# Patient Record
Sex: MALE | Race: WHITE | Employment: FULL TIME | URBAN - METROPOLITAN AREA
[De-identification: names, ages, dates, MRNs, and addresses within clinical notes are randomized per-mention and may not be internally consistent; named-entity substitution may affect disease eponyms.]

---

## 2019-09-26 ENCOUNTER — HOSPITAL ENCOUNTER (EMERGENCY)
Facility: CLINIC | Age: 44
Discharge: HOME | End: 2019-09-26
Attending: EMERGENCY MEDICINE | Admitting: EMERGENCY MEDICINE
Payer: COMMERCIAL

## 2019-09-26 ENCOUNTER — APPOINTMENT (OUTPATIENT)
Dept: CT IMAGING | Facility: CLINIC | Age: 44
End: 2019-09-26
Attending: EMERGENCY MEDICINE
Payer: COMMERCIAL

## 2019-09-26 VITALS
HEIGHT: 68 IN | TEMPERATURE: 98.9 F | WEIGHT: 205 LBS | OXYGEN SATURATION: 97 % | SYSTOLIC BLOOD PRESSURE: 125 MMHG | DIASTOLIC BLOOD PRESSURE: 92 MMHG | BODY MASS INDEX: 31.07 KG/M2 | HEART RATE: 72 BPM | RESPIRATION RATE: 18 BRPM

## 2019-09-26 DIAGNOSIS — Z72.0 TOBACCO USE: ICD-10-CM

## 2019-09-26 DIAGNOSIS — R10.9 RIGHT LATERAL ABDOMINAL PAIN: ICD-10-CM

## 2019-09-26 DIAGNOSIS — R91.1 PULMONARY NODULE, RIGHT: ICD-10-CM

## 2019-09-26 LAB
ALBUMIN SERPL-MCNC: 4 G/DL (ref 3.4–5)
ALBUMIN UR-MCNC: NEGATIVE MG/DL
ALP SERPL-CCNC: 133 U/L (ref 40–150)
ALT SERPL W P-5'-P-CCNC: 27 U/L (ref 0–70)
ANION GAP SERPL CALCULATED.3IONS-SCNC: 4 MMOL/L (ref 3–14)
APPEARANCE UR: CLEAR
AST SERPL W P-5'-P-CCNC: 10 U/L (ref 0–45)
BASOPHILS # BLD AUTO: 0.1 10E9/L (ref 0–0.2)
BASOPHILS NFR BLD AUTO: 1.3 %
BILIRUB SERPL-MCNC: 0.4 MG/DL (ref 0.2–1.3)
BILIRUB UR QL STRIP: NEGATIVE
BUN SERPL-MCNC: 7 MG/DL (ref 7–30)
CALCIUM SERPL-MCNC: 9.1 MG/DL (ref 8.5–10.1)
CHLORIDE SERPL-SCNC: 107 MMOL/L (ref 94–109)
CO2 SERPL-SCNC: 28 MMOL/L (ref 20–32)
COLOR UR AUTO: NORMAL
CREAT SERPL-MCNC: 0.81 MG/DL (ref 0.66–1.25)
DIFFERENTIAL METHOD BLD: NORMAL
EOSINOPHIL # BLD AUTO: 0.4 10E9/L (ref 0–0.7)
EOSINOPHIL NFR BLD AUTO: 4.9 %
ERYTHROCYTE [DISTWIDTH] IN BLOOD BY AUTOMATED COUNT: 13.2 % (ref 10–15)
GFR SERPL CREATININE-BSD FRML MDRD: >90 ML/MIN/{1.73_M2}
GLUCOSE SERPL-MCNC: 84 MG/DL (ref 70–99)
GLUCOSE UR STRIP-MCNC: NEGATIVE MG/DL
HCT VFR BLD AUTO: 47.3 % (ref 40–53)
HGB BLD-MCNC: 15.5 G/DL (ref 13.3–17.7)
HGB UR QL STRIP: NEGATIVE
IMM GRANULOCYTES # BLD: 0 10E9/L (ref 0–0.4)
IMM GRANULOCYTES NFR BLD: 0.3 %
KETONES UR STRIP-MCNC: NEGATIVE MG/DL
LEUKOCYTE ESTERASE UR QL STRIP: NEGATIVE
LIPASE SERPL-CCNC: 164 U/L (ref 73–393)
LYMPHOCYTES # BLD AUTO: 1.7 10E9/L (ref 0.8–5.3)
LYMPHOCYTES NFR BLD AUTO: 21.1 %
MCH RBC QN AUTO: 29.8 PG (ref 26.5–33)
MCHC RBC AUTO-ENTMCNC: 32.8 G/DL (ref 31.5–36.5)
MCV RBC AUTO: 91 FL (ref 78–100)
MONOCYTES # BLD AUTO: 0.8 10E9/L (ref 0–1.3)
MONOCYTES NFR BLD AUTO: 10.3 %
NEUTROPHILS # BLD AUTO: 5 10E9/L (ref 1.6–8.3)
NEUTROPHILS NFR BLD AUTO: 62.1 %
NITRATE UR QL: NEGATIVE
NRBC # BLD AUTO: 0 10*3/UL
NRBC BLD AUTO-RTO: 0 /100
PH UR STRIP: 6.5 PH (ref 5–7)
PLATELET # BLD AUTO: 380 10E9/L (ref 150–450)
POTASSIUM SERPL-SCNC: 3.8 MMOL/L (ref 3.4–5.3)
PROT SERPL-MCNC: 7.9 G/DL (ref 6.8–8.8)
RBC # BLD AUTO: 5.2 10E12/L (ref 4.4–5.9)
RBC #/AREA URNS AUTO: 0 /HPF (ref 0–2)
SODIUM SERPL-SCNC: 139 MMOL/L (ref 133–144)
SOURCE: NORMAL
SP GR UR STRIP: 1 (ref 1–1.03)
UROBILINOGEN UR STRIP-MCNC: NORMAL MG/DL (ref 0–2)
WBC # BLD AUTO: 8 10E9/L (ref 4–11)
WBC #/AREA URNS AUTO: 2 /HPF (ref 0–5)

## 2019-09-26 PROCEDURE — 96361 HYDRATE IV INFUSION ADD-ON: CPT

## 2019-09-26 PROCEDURE — 96374 THER/PROPH/DIAG INJ IV PUSH: CPT | Mod: 59

## 2019-09-26 PROCEDURE — 85025 COMPLETE CBC W/AUTO DIFF WBC: CPT | Performed by: EMERGENCY MEDICINE

## 2019-09-26 PROCEDURE — 96375 TX/PRO/DX INJ NEW DRUG ADDON: CPT

## 2019-09-26 PROCEDURE — 25000128 H RX IP 250 OP 636: Performed by: EMERGENCY MEDICINE

## 2019-09-26 PROCEDURE — 81001 URINALYSIS AUTO W/SCOPE: CPT | Performed by: EMERGENCY MEDICINE

## 2019-09-26 PROCEDURE — 83690 ASSAY OF LIPASE: CPT | Performed by: EMERGENCY MEDICINE

## 2019-09-26 PROCEDURE — 25000125 ZZHC RX 250: Performed by: EMERGENCY MEDICINE

## 2019-09-26 PROCEDURE — 99285 EMERGENCY DEPT VISIT HI MDM: CPT | Mod: 25

## 2019-09-26 PROCEDURE — 25000132 ZZH RX MED GY IP 250 OP 250 PS 637: Performed by: EMERGENCY MEDICINE

## 2019-09-26 PROCEDURE — 74177 CT ABD & PELVIS W/CONTRAST: CPT

## 2019-09-26 PROCEDURE — 80053 COMPREHEN METABOLIC PANEL: CPT | Performed by: EMERGENCY MEDICINE

## 2019-09-26 RX ORDER — MORPHINE SULFATE 4 MG/ML
4 INJECTION, SOLUTION INTRAMUSCULAR; INTRAVENOUS ONCE
Status: COMPLETED | OUTPATIENT
Start: 2019-09-26 | End: 2019-09-26

## 2019-09-26 RX ORDER — KETOROLAC TROMETHAMINE 15 MG/ML
15 INJECTION, SOLUTION INTRAMUSCULAR; INTRAVENOUS ONCE
Status: COMPLETED | OUTPATIENT
Start: 2019-09-26 | End: 2019-09-26

## 2019-09-26 RX ORDER — HYDROCODONE BITARTRATE AND ACETAMINOPHEN 5; 325 MG/1; MG/1
1 TABLET ORAL EVERY 8 HOURS PRN
Qty: 6 TABLET | Refills: 0 | Status: SHIPPED | OUTPATIENT
Start: 2019-09-26 | End: 2019-09-28

## 2019-09-26 RX ORDER — LIDOCAINE 4 G/G
1 PATCH TOPICAL ONCE
Status: DISCONTINUED | OUTPATIENT
Start: 2019-09-26 | End: 2019-09-26 | Stop reason: HOSPADM

## 2019-09-26 RX ORDER — LIDOCAINE 50 MG/G
1 PATCH TOPICAL EVERY 24 HOURS
Qty: 10 PATCH | Refills: 0 | Status: SHIPPED | OUTPATIENT
Start: 2019-09-26 | End: 2019-10-06

## 2019-09-26 RX ORDER — IOPAMIDOL 755 MG/ML
500 INJECTION, SOLUTION INTRAVASCULAR ONCE
Status: COMPLETED | OUTPATIENT
Start: 2019-09-26 | End: 2019-09-26

## 2019-09-26 RX ORDER — IBUPROFEN 600 MG/1
600 TABLET, FILM COATED ORAL EVERY 6 HOURS PRN
Qty: 40 TABLET | Refills: 0 | Status: SHIPPED | OUTPATIENT
Start: 2019-09-26 | End: 2019-10-06

## 2019-09-26 RX ADMIN — SODIUM CHLORIDE 65 ML: 9 INJECTION, SOLUTION INTRAVENOUS at 12:48

## 2019-09-26 RX ADMIN — LIDOCAINE 1 PATCH: 560 PATCH PERCUTANEOUS; TOPICAL; TRANSDERMAL at 11:28

## 2019-09-26 RX ADMIN — SODIUM CHLORIDE 1000 ML: 9 INJECTION, SOLUTION INTRAVENOUS at 11:17

## 2019-09-26 RX ADMIN — MORPHINE SULFATE 4 MG: 4 INJECTION INTRAVENOUS at 11:25

## 2019-09-26 RX ADMIN — KETOROLAC TROMETHAMINE 15 MG: 15 INJECTION, SOLUTION INTRAMUSCULAR; INTRAVENOUS at 11:25

## 2019-09-26 RX ADMIN — IOPAMIDOL 100 ML: 755 INJECTION, SOLUTION INTRAVENOUS at 12:48

## 2019-09-26 ASSESSMENT — ENCOUNTER SYMPTOMS
SHORTNESS OF BREATH: 0
VOMITING: 0
DIARRHEA: 0
ABDOMINAL PAIN: 1
CONSTIPATION: 0
HEMATURIA: 0
FEVER: 0
DYSURIA: 0
FREQUENCY: 0

## 2019-09-26 ASSESSMENT — MIFFLIN-ST. JEOR: SCORE: 1794.37

## 2019-09-26 NOTE — ED PROVIDER NOTES
"  History     Chief Complaint:  Abdominal Pain    The history is provided by the patient.      Umair Cunha is a 44 year old male smoker who presents for evaluation of abdominal pain. Umair describes \"pointed,\" non-radiating lower right lateral abdominal pain that began about a week ago.  It is progressively worsened in intensity to current 7/10 and it is described as sharp,  \"like a gas pocket build up.\"  It is exacerbated by movement and deep inspiration but not by palpation.  Indeed, it feels improved when he puts pressure on the area, but not with ibuprofen. He has had regular bowel movements without diarrhea.  He denies flank or groin pain, vomiting, fever, hematuria, dysuria, and shortness of breath.  He has never had abdominal surgery.    Allergies:  No Known Drug Allergies      Medications:    The patient is not currently taking any prescribed medications.     Past Medical History:    Tobacco use    Past Surgical History:    History reviewed. No pertinent past surgical history.     Family History:    The patient denies any relevant family medical history.     Social History:  The patient was accompanied to the ED by family.  Smoking Status: Current    Review of Systems   Constitutional: Negative for appetite change and fever.   Respiratory: Negative for shortness of breath.    Gastrointestinal: Positive for abdominal pain. Negative for constipation, diarrhea and vomiting.   Genitourinary: Negative for dysuria, flank pain, frequency, hematuria and testicular pain.   All other systems reviewed and are negative.    Physical Exam     Patient Vitals for the past 24 hrs:   BP Temp Temp src Pulse Resp SpO2 Height Weight   09/26/19 1345 (!) 125/92 -- -- 72 -- 97 % -- --   09/26/19 1330 (!) 120/91 -- -- 72 -- -- -- --   09/26/19 1230 128/87 -- -- 76 -- 95 % -- --   09/26/19 1215 122/89 -- -- 72 -- 95 % -- --   09/26/19 1200 123/87 -- -- 71 -- 97 % -- --   09/26/19 1145 (!) 133/90 -- -- 76 -- 94 % -- -- " "  09/26/19 1130 118/89 -- -- 81 -- 95 % -- --   09/26/19 1015 (!) 117/91 98.9  F (37.2  C) Temporal 93 18 98 % 1.727 m (5' 8\") 93 kg (205 lb)     Physical Exam  General: Well-developed and well-nourished. Well appearing middle aged  man. Cooperative.  Head:  Atraumatic.  Eyes:  Conjunctivae, lids, and sclerae are normal.  ENT:    Normal nose. Moist mucous membranes.  Neck:  Supple. Normal range of motion.  CV:  Regular rate and rhythm. Normal heart sounds with no murmurs, rubs, or gallops detected.  Resp:  No respiratory distress. Clear to auscultation bilaterally without decreased breath sounds, wheezing, rales, or rhonchi.  GI:  Soft. Non-distended. Non-tender. No CVA tenderness.   MS:  Normal ROM.   Skin:  Warm. Non-diaphoretic. No pallor. No rash.  Neuro:  Awake. A&Ox3. Normal strength.  Psych: Normal mood and affect. Normal speech.  Vitals reviewed.    Emergency Department Course   Imaging:  Radiology findings were communicated with the patient and family who voiced understanding of the findings.    CT abdomen pelvis w contrast  IMPRESSION:   1. No acute abnormality in the abdomen or pelvis. No cause for right  lower quadrant pain is identified.  2. Indeterminate 0.7 cm right lower lobe pulmonary nodule. Please  refer to pulmonary nodule follow-up guidelines below.  Reading per Radiology.    Laboratory:  Laboratory findings were communicated with the patient and family who voiced understanding of the findings.    CBC: AWNL (WBC 8.0, HGB 15.5, )  BMP: AWNL (Creatinine 0.81)   Lipase: 164     Routine UA with Microscopic: AWNL     Interventions:  1117 0.9% sodium chloride bolus 1000 IV  1125 Toradol 15 mg IV  1125 Morphine 4 mg IV  1128 Lidocaine 4% 1 patch Transdermal    Emergency Department Course:  Nursing notes and vitals reviewed.  The patient was sent for a CT abdomen pelvis w contrast while in the emergency department, results above.   IV was inserted and blood was drawn for laboratory " testing, results above.   The patient provided a urine sample here in the emergency department. This was sent for laboratory testing, findings above.     (1100)   I performed an exam of the patient as documented above. History obtained from patient.    (4111)   Patient states pain is better after interventions here. Findings and plan explained to the patient. Patient discharged home with instructions regarding supportive care, medications, and reasons to return. The importance of close follow-up was reviewed. The patient was prescribed Norco, ibuprofen, and lidocaine patches.    Impression & Plan      Medical Decision Making:  Umair is a 44-year-old man who is otherwise healthy but developed pain on the lateral abdominal wall, very low near the right quadrant about a week ago, which has progressively worsened in intensity since that time.  He denies all other concerns or complaints.  He has no tenderness in the flank or abdomen or even on the site of pain low on the lateral abdominal wall.  Indeed, he states pressing on the area makes it feel improved.  However, given worsening pain that sounds to be somewhat colicky in nature, ureterolithiasis to be considered.  As it is nearest the right lower quadrant so a very atypical presentation of appendicitis should be considered and, given progression of symptoms, I felt a work-up was prudent.  He has no rash to suggest zoster. Fortunately, his work-up is unremarkable including no evidence of leukocytosis, kidney injury, pancreatitis, or urinary tract infection.  There is no ureterolithiasis on CT scan with no other acute findings.  There is an incidental right lower lobe lung nodule which I discussed with the patient.  Because he is a smoker, he is at high risk and he understands the need to have a repeat CT scan in 6 months.  I also recommended he stop smoking.  Regarding his pain, this is likely musculoskeletal in etiology.  A Lidoderm patch was placed and he was given  morphine and Toradol as well as IV fluids and on repeat evaluation states he is feeling improved.  I have recommended continued ibuprofen and Lidoderm patches and I will give him a small prescription for [place] as needed if his pain is severe.  I suspect his symptoms will improve slowly with time though he understands that because diagnosis remains unclear he should return immediately if he has severe or changed pain or development of new symptoms including, but not limited to, fever, shortness of breath, vomiting or diarrhea, or pain elsewhere.  Notably, PE is highly unlikely as the location of patient's pain is very low, near the ASIS, rather than near the diaphragm. Patient verbalized understanding of the results and treatment plan as well as need for follow-up and incidental pulmonary nodule.  He also understands low threshold for return and all of his questions were answered.  Amenable to discharge.    Diagnosis:    ICD-10-CM    1. Right lateral abdominal pain R10.9    2. Pulmonary nodule, right R91.1    3. Tobacco use Z72.0         Disposition:   Discharged.    Discharge Medications:     Medication List      Started    HYDROcodone-acetaminophen 5-325 MG tablet  Commonly known as:  NORCO  1 tablet, Oral, EVERY 8 HOURS PRN     ibuprofen 600 MG tablet  Commonly known as:  ADVIL/MOTRIN  600 mg, Oral, EVERY 6 HOURS PRN     lidocaine 5 % patch  Commonly known as:  LIDODERM  1 patch, Transdermal, EVERY 24 HOURS            Scribe Disclosure:  IJimbo, am serving as a scribe at 12:16 PM on 9/26/2019 to document services personally performed by Veronika Aguero MD, based on my observations and the provider's statements to me.    Scribe Disclosure:  I, Amena Lund, am serving as a scribe at 3:01 PM on 9/26/2019 to document services personally performed by Veronika Aguero MD based on my observations and the provider's statements to me.    9/26/2019   [facility] [place] EMERGENCY DEPARTMENT       Laci  Veronika VALDEZ MD  09/27/19 0847

## 2019-09-26 NOTE — ED AVS SNAPSHOT
[facility] Emergency Department  [address]  [facility] [address]  Phone:  [phone]  Fax:  [phone]                                    Umair Cunha   MRN: [id number]    Department:  [facility] Emergency Department   Date of Visit:  9/26/2019           After Visit Summary Signature Page    I have received my discharge instructions, and my questions have been answered. I have discussed any challenges I see with this plan with the nurse or doctor.    ..........................................................................................................................................  Patient/Patient Representative Signature      ..........................................................................................................................................  Patient Representative Print Name and Relationship to Patient    ..................................................               ................................................  Date                                   Time    ..........................................................................................................................................  Reviewed by Signature/Title    ...................................................              ..............................................  Date                                               Time          22EPIC Rev 08/18

## 2019-09-27 ASSESSMENT — ENCOUNTER SYMPTOMS
FLANK PAIN: 0
APPETITE CHANGE: 0

## 2021-10-05 NOTE — DISCHARGE INSTRUCTIONS
Repeat CT scan of the chest in 6 months for nodule.   Stop smoking.    Ibuprofen and Lidoderm patch for pain which I suspect will improve slowly with time.  Norco as needed for more severe pain.     Return immediately with severe or changed pain, fever greater than 101  F, uncontrolled vomiting or diarrhea, shortness of breath, chest pain, or any other new or concerning symptoms.    
5